# Patient Record
Sex: MALE | Race: BLACK OR AFRICAN AMERICAN | NOT HISPANIC OR LATINO | Employment: FULL TIME | URBAN - METROPOLITAN AREA
[De-identification: names, ages, dates, MRNs, and addresses within clinical notes are randomized per-mention and may not be internally consistent; named-entity substitution may affect disease eponyms.]

---

## 2022-04-04 ENCOUNTER — OFFICE VISIT (OUTPATIENT)
Dept: OTOLARYNGOLOGY | Facility: CLINIC | Age: 67
End: 2022-04-04
Payer: MEDICARE

## 2022-04-04 VITALS — SYSTOLIC BLOOD PRESSURE: 162 MMHG | DIASTOLIC BLOOD PRESSURE: 70 MMHG | TEMPERATURE: 98 F | HEART RATE: 47 BPM

## 2022-04-04 DIAGNOSIS — R49.0 DYSPHONIA: Primary | ICD-10-CM

## 2022-04-04 DIAGNOSIS — J95.09 TRACHEOSTOMY GRANULOMA: ICD-10-CM

## 2022-04-04 DIAGNOSIS — J38.4 SUPRAGLOTTIC EDEMA: ICD-10-CM

## 2022-04-04 DIAGNOSIS — J38.01 UNILATERAL COMPLETE VOCAL FOLD PARALYSIS: ICD-10-CM

## 2022-04-04 DIAGNOSIS — C32.9 LARYNX CANCER: ICD-10-CM

## 2022-04-04 LAB
CTP QC/QA: YES
SARS-COV-2 RDRP RESP QL NAA+PROBE: NEGATIVE

## 2022-04-04 PROCEDURE — 99999 PR PBB SHADOW E&M-NEW PATIENT-LVL III: ICD-10-PCS | Mod: PBBFAC,,, | Performed by: OTOLARYNGOLOGY

## 2022-04-04 PROCEDURE — 3078F DIAST BP <80 MM HG: CPT | Mod: CPTII,S$GLB,, | Performed by: OTOLARYNGOLOGY

## 2022-04-04 PROCEDURE — U0002: ICD-10-PCS | Mod: QW,S$GLB,, | Performed by: OTOLARYNGOLOGY

## 2022-04-04 PROCEDURE — 3288F FALL RISK ASSESSMENT DOCD: CPT | Mod: CPTII,S$GLB,, | Performed by: OTOLARYNGOLOGY

## 2022-04-04 PROCEDURE — 99204 PR OFFICE/OUTPT VISIT, NEW, LEVL IV, 45-59 MIN: ICD-10-PCS | Mod: 25,S$GLB,, | Performed by: OTOLARYNGOLOGY

## 2022-04-04 PROCEDURE — 3288F PR FALLS RISK ASSESSMENT DOCUMENTED: ICD-10-PCS | Mod: CPTII,S$GLB,, | Performed by: OTOLARYNGOLOGY

## 2022-04-04 PROCEDURE — 99204 OFFICE O/P NEW MOD 45 MIN: CPT | Mod: 25,S$GLB,, | Performed by: OTOLARYNGOLOGY

## 2022-04-04 PROCEDURE — 3077F SYST BP >= 140 MM HG: CPT | Mod: CPTII,S$GLB,, | Performed by: OTOLARYNGOLOGY

## 2022-04-04 PROCEDURE — 1159F MED LIST DOCD IN RCRD: CPT | Mod: CPTII,S$GLB,, | Performed by: OTOLARYNGOLOGY

## 2022-04-04 PROCEDURE — 4010F PR ACE/ARB THEARPY RXD/TAKEN: ICD-10-PCS | Mod: CPTII,S$GLB,, | Performed by: OTOLARYNGOLOGY

## 2022-04-04 PROCEDURE — 31575 DIAGNOSTIC LARYNGOSCOPY: CPT | Mod: S$GLB,,, | Performed by: OTOLARYNGOLOGY

## 2022-04-04 PROCEDURE — 3077F PR MOST RECENT SYSTOLIC BLOOD PRESSURE >= 140 MM HG: ICD-10-PCS | Mod: CPTII,S$GLB,, | Performed by: OTOLARYNGOLOGY

## 2022-04-04 PROCEDURE — U0002 COVID-19 LAB TEST NON-CDC: HCPCS | Mod: QW,S$GLB,, | Performed by: OTOLARYNGOLOGY

## 2022-04-04 PROCEDURE — 1101F PR PT FALLS ASSESS DOC 0-1 FALLS W/OUT INJ PAST YR: ICD-10-PCS | Mod: CPTII,S$GLB,, | Performed by: OTOLARYNGOLOGY

## 2022-04-04 PROCEDURE — 1125F PR PAIN SEVERITY QUANTIFIED, PAIN PRESENT: ICD-10-PCS | Mod: CPTII,S$GLB,, | Performed by: OTOLARYNGOLOGY

## 2022-04-04 PROCEDURE — 4010F ACE/ARB THERAPY RXD/TAKEN: CPT | Mod: CPTII,S$GLB,, | Performed by: OTOLARYNGOLOGY

## 2022-04-04 PROCEDURE — 31575 PR LARYNGOSCOPY, FLEXIBLE; DIAGNOSTIC: ICD-10-PCS | Mod: S$GLB,,, | Performed by: OTOLARYNGOLOGY

## 2022-04-04 PROCEDURE — 1159F PR MEDICATION LIST DOCUMENTED IN MEDICAL RECORD: ICD-10-PCS | Mod: CPTII,S$GLB,, | Performed by: OTOLARYNGOLOGY

## 2022-04-04 PROCEDURE — 1101F PT FALLS ASSESS-DOCD LE1/YR: CPT | Mod: CPTII,S$GLB,, | Performed by: OTOLARYNGOLOGY

## 2022-04-04 PROCEDURE — 1125F AMNT PAIN NOTED PAIN PRSNT: CPT | Mod: CPTII,S$GLB,, | Performed by: OTOLARYNGOLOGY

## 2022-04-04 PROCEDURE — 99999 PR PBB SHADOW E&M-NEW PATIENT-LVL III: CPT | Mod: PBBFAC,,, | Performed by: OTOLARYNGOLOGY

## 2022-04-04 PROCEDURE — 3078F PR MOST RECENT DIASTOLIC BLOOD PRESSURE < 80 MM HG: ICD-10-PCS | Mod: CPTII,S$GLB,, | Performed by: OTOLARYNGOLOGY

## 2022-04-04 RX ORDER — CLINDAMYCIN HYDROCHLORIDE 300 MG/1
300 CAPSULE ORAL 3 TIMES DAILY
Qty: 42 CAPSULE | Refills: 0 | Status: SHIPPED | OUTPATIENT
Start: 2022-04-04 | End: 2022-04-18

## 2022-04-04 RX ORDER — MOMETASONE FUROATE 1 MG/G
CREAM TOPICAL DAILY
Qty: 15 G | Refills: 1 | Status: SHIPPED | OUTPATIENT
Start: 2022-04-04 | End: 2023-06-21

## 2022-04-04 NOTE — PROGRESS NOTES
[facility]  Department of Otorhinolaryngology and Communication Sciences    Siddharth Green is a 66 y.o. male who presents to the [facility] self-referred for further evaluation of throat problems.     History is obtained by conversation with the patient and his wife.  There are no available outside records for review.    He has a history of advanced stage laryngeal cancer, for which he underwent chemo radiation completed in August 2021 in the [facility].  He required trach at the time of his direct laryngoscopy with biopsy.  He has been trach dependent since that time.  He required a gastric tube within a few weeks of initiating treatment.  He reports that he lost plenty of weight involuntarily over the course of his treatment.  He had few swallow studies over the course of his treatment which raised concern for aspiration.  His most recent swallow study, however, was inconclusive.  Nevertheless, the patient has been tolerating an unrestricted diet without any aspiration related illnesses.  He reports he has had to post treatment PET CTs which have been negative for residual or recurrent disease.    Today he complains of peristomal tracheostomy pain, which has been present for quite some time.  In addition, he complains that he can no longer breathe through his nose when he has the trach plugged with his finger.  He would like to know why this is the case.  He also would like to know why the trach cannot come out.    Dr. Atkins is his radiation oncologist  Dr. Barba is his medical oncologist    Past Medical History  He has a past medical history of Coronary artery disease, Hyperlipidemia, and Hypertension.    Past Surgical History  He has a past surgical history that includes Coronary angioplasty with stent (8/2012).    Family History  His family history is not on file.    Social History  He reports that he has never smoked. He does not have any smokeless tobacco history on file. He reports  that he does not drink alcohol and does not use drugs.    Allergies  He has No Known Allergies.    Medications  He has a current medication list which includes the following prescription(s): allopurinol, amlodipine, atorvastatin, hydrochlorothiazide, niacin, nitroglycerin, prasugrel, valsartan, and carvedilol.    Review of Systems   Constitutional: Negative for fever.   HENT: Negative for sore throat.    Eyes: Negative for visual disturbance.   Respiratory: Negative for wheezing.    Cardiovascular: Negative for chest pain.   Gastrointestinal: Negative for nausea.   Musculoskeletal: Negative for arthralgias.   Skin: Negative for rash.   Neurological: Negative for tremors.   Hematological: Does not bruise/bleed easily.   Psychiatric/Behavioral: The patient is not nervous/anxious.           Objective:     BP (!) 162/70   Pulse (!) 47   Temp 97.9 °F (36.6 °C)    Physical Exam  Constitutional: comfortable, well dressed  Psychiatric: appropriate affect  Respiratory: comfortably breathing, symmetric chest rise, no stridor  Voice: moderate roughness/asthenia  Cardiovascular: upper extremities non-edematous  Lymphatic: no cervical lymphadenopathy  Neurologic: alert and oriented to time, place, person, and situation; cranial nerves 3-12 grossly intact  Head: normocephalic  Eyes: conjunctivae and sclerae clear  Ears: normal pinnae, normal external auditory canals, tympanic membranes intact  Nose: mucosa pink and noncongested, no masses, no mucopurulence, no polyps  Oral cavity / oropharynx: no mucosal lesions  Neck: diffuse post-treatment changes--edema, firmness; 6-0 metal trach tube in place, patent, secure, skin intact, + peristomal granulation  Indirect laryngoscopy: limited due to gag    Procedure  Flexible Laryngoscopy (83435): Laryngoscopy is indicated for assessment of upper aerodigestive structure and function. This was carried out transnasally with a distal chip videoendoscope. After verbal consent was obtained,  the patient was positioned and the nose was topically decongested with 1% phenylephrine and topically anesthetized with 4% lidocaine. The endoscope was passed through the most patent nasal cavity and positioned to image the nasopharynx, larynx, and hypopharynx in detail. The following features were examined: nasopharyngeal, laryngeal, hypopharyngeal masses; velopharyngeal strength, closure, and symmetry of motion; vocal fold range and symmetry of motion; laryngeal mucosal edema, erythema, inflammation, and hydration; salivary pooling; and gross laryngeal sensation. The equipment was removed. The patient tolerated the procedure well without complication. All findings were normal except:  - right vocal fold immobile, paramedian  - mild left vocal fold motion impairment  - severe posterior larynegla syupraglottic and postcricoid edema    Tracheobronchoscopy through Established Tracheal Stoma (27294): Tracheobronchoscopy is indicated for airway evaluation, as well as to evaluate for sequelae of  a chronic indwelling tracheostomy tube. After verbal consent was obtained, the patient was positioned and the laryngotracheal complex was topically anesthetized with 2 mL of 4% lidocaine administered via the tracheostomy tube. After an adequate degree of anesthesia was obtained, the endoscope was passed through the tracheostomy tubeto achieve a magnified view of the trachea and proximal mainstem bronchi. The scope was retroflexed for a magnified view of the proximal trachea, subglottis, and infraglottic vocal folds. Structures were evaluated for the following features: the presence of laryngeal, tracheal, or bronchial masses; vocal fold range and symmetry of motion; mucosal edema, erythema, or inflammation; mucosal hydration; gross laryngeal sensation; laryngeal stenosis; tracheal stenosis; position of tracheostomy tube; and mucopurulence, blood, or thickened secretions. The equipment was removed. The patient tolerated the  procedure well without complication. All findings were normal except:  - trach in place  - thick dry secretions in distal trachea      Assessment:     Siddharth Green is a 66 y.o. male with chronic aerodigestive dysfunction following treatment for an advanced stage laryngeal cancer, completed in 8/2021. He remains trach-dependent and feeding tube dependent.       Plan:        I had a discussion with the patient and his wife regarding his condition and the further workup and management options.      I showed him a video of his laryngeal examination and educated him on normal anatomy, his disordered anatomy, why the trach is necessary.  I counseled him regarding a significant possibility that he will be tracheostomy dependent for the foreseeable future.    I recommended a 2 week course of clindamycin and topical mometasone for treatment of his peristomal granulation.  I recommended routine trach care.    I am happy to reassess the patient at any time.  In the meantime, I recommended continued follow-up with his other doctors for post treatment cancer surveillance.  He is welcome to establish care with one of our head neck surgery team members for ongoing surveillance from an ENT/head neck perspective.    All questions were answered, and the patient is in agreement with the above.     Ziggy Quiroz M.D.  [facility]  Department of Otorhinolaryngology and Communication Sciences

## 2022-04-13 ENCOUNTER — TELEPHONE (OUTPATIENT)
Dept: OTOLARYNGOLOGY | Facility: CLINIC | Age: 67
End: 2022-04-13
Payer: MEDICARE

## 2022-04-13 NOTE — TELEPHONE ENCOUNTER
----- Message from Isabelle Choudhury RN sent at 4/12/2022 11:01 AM CDT -----  Contact: [facility]    [phone]    ----- Message -----  From: Meagan Benavidez  Sent: 4/12/2022  10:58 AM CDT  To: Richie RODRÍGUEZ Staff    Calling to request a copy of pts clinical notes from 4-4-22.  Pls fax to [phone].

## 2022-06-15 ENCOUNTER — TELEPHONE (OUTPATIENT)
Dept: OTOLARYNGOLOGY | Facility: CLINIC | Age: 67
End: 2022-06-15
Payer: MEDICARE

## 2022-06-15 NOTE — TELEPHONE ENCOUNTER
----- Message from Keren Diop RN sent at 6/10/2022  1:38 PM CDT -----  Regarding: FW: call back  Contact: [phone]  For Monday   ----- Message -----  From: Anjelica Gautam  Sent: 6/10/2022   1:11 PM CDT  To: Zonia Hnedrix Staff  Subject: call back                                        Type:  Sooner Apoointment Request    Caller is requesting a sooner appointment.  Caller declined first available appointment listed below.  Caller will not accept being placed on the waitlist and is requesting a message be sent to doctor.  Name of Caller: New PT   When is the first available appointment? August   Symptoms: Throat scoped due to throat cancer   Would the patient rather a call back or a response via [facility]? Call back   Best Call Back Number: [phone]  Additional Information: would like to be seen sometime in July early morning

## 2022-07-27 ENCOUNTER — OFFICE VISIT (OUTPATIENT)
Dept: OTOLARYNGOLOGY | Facility: CLINIC | Age: 67
End: 2022-07-27
Payer: MEDICARE

## 2022-07-27 VITALS
WEIGHT: 240 LBS | DIASTOLIC BLOOD PRESSURE: 68 MMHG | SYSTOLIC BLOOD PRESSURE: 127 MMHG | BODY MASS INDEX: 33.47 KG/M2 | HEART RATE: 46 BPM

## 2022-07-27 DIAGNOSIS — R49.0 DYSPHONIA: Primary | ICD-10-CM

## 2022-07-27 DIAGNOSIS — J38.01 UNILATERAL COMPLETE VOCAL FOLD PARALYSIS: ICD-10-CM

## 2022-07-27 DIAGNOSIS — J95.09 TRACHEOSTOMY GRANULOMA: ICD-10-CM

## 2022-07-27 DIAGNOSIS — J38.4 SUPRAGLOTTIC EDEMA: ICD-10-CM

## 2022-07-27 DIAGNOSIS — C32.9 LARYNX CANCER: ICD-10-CM

## 2022-07-27 PROCEDURE — 31615 PR SCOPE THRU TRACHEOSTOMY: ICD-10-PCS | Mod: S$GLB,,, | Performed by: OTOLARYNGOLOGY

## 2022-07-27 PROCEDURE — 1159F MED LIST DOCD IN RCRD: CPT | Mod: CPTII,S$GLB,, | Performed by: OTOLARYNGOLOGY

## 2022-07-27 PROCEDURE — 3074F SYST BP LT 130 MM HG: CPT | Mod: CPTII,S$GLB,, | Performed by: OTOLARYNGOLOGY

## 2022-07-27 PROCEDURE — 31575 PR LARYNGOSCOPY, FLEXIBLE; DIAGNOSTIC: ICD-10-PCS | Mod: 59,S$GLB,, | Performed by: OTOLARYNGOLOGY

## 2022-07-27 PROCEDURE — 99999 PR PBB SHADOW E&M-EST. PATIENT-LVL III: CPT | Mod: PBBFAC,,, | Performed by: OTOLARYNGOLOGY

## 2022-07-27 PROCEDURE — 4010F PR ACE/ARB THEARPY RXD/TAKEN: ICD-10-PCS | Mod: CPTII,S$GLB,, | Performed by: OTOLARYNGOLOGY

## 2022-07-27 PROCEDURE — 3008F BODY MASS INDEX DOCD: CPT | Mod: CPTII,S$GLB,, | Performed by: OTOLARYNGOLOGY

## 2022-07-27 PROCEDURE — 1126F AMNT PAIN NOTED NONE PRSNT: CPT | Mod: CPTII,S$GLB,, | Performed by: OTOLARYNGOLOGY

## 2022-07-27 PROCEDURE — 3074F PR MOST RECENT SYSTOLIC BLOOD PRESSURE < 130 MM HG: ICD-10-PCS | Mod: CPTII,S$GLB,, | Performed by: OTOLARYNGOLOGY

## 2022-07-27 PROCEDURE — 99213 OFFICE O/P EST LOW 20 MIN: CPT | Mod: 25,S$GLB,, | Performed by: OTOLARYNGOLOGY

## 2022-07-27 PROCEDURE — 31575 DIAGNOSTIC LARYNGOSCOPY: CPT | Mod: 59,S$GLB,, | Performed by: OTOLARYNGOLOGY

## 2022-07-27 PROCEDURE — 31615 TRCHEOBRNCHSC EST TRACHS INC: CPT | Mod: S$GLB,,, | Performed by: OTOLARYNGOLOGY

## 2022-07-27 PROCEDURE — 1126F PR PAIN SEVERITY QUANTIFIED, NO PAIN PRESENT: ICD-10-PCS | Mod: CPTII,S$GLB,, | Performed by: OTOLARYNGOLOGY

## 2022-07-27 PROCEDURE — 1160F PR REVIEW ALL MEDS BY PRESCRIBER/CLIN PHARMACIST DOCUMENTED: ICD-10-PCS | Mod: CPTII,S$GLB,, | Performed by: OTOLARYNGOLOGY

## 2022-07-27 PROCEDURE — 1160F RVW MEDS BY RX/DR IN RCRD: CPT | Mod: CPTII,S$GLB,, | Performed by: OTOLARYNGOLOGY

## 2022-07-27 PROCEDURE — 99213 PR OFFICE/OUTPT VISIT, EST, LEVL III, 20-29 MIN: ICD-10-PCS | Mod: 25,S$GLB,, | Performed by: OTOLARYNGOLOGY

## 2022-07-27 PROCEDURE — 3078F PR MOST RECENT DIASTOLIC BLOOD PRESSURE < 80 MM HG: ICD-10-PCS | Mod: CPTII,S$GLB,, | Performed by: OTOLARYNGOLOGY

## 2022-07-27 PROCEDURE — 3008F PR BODY MASS INDEX (BMI) DOCUMENTED: ICD-10-PCS | Mod: CPTII,S$GLB,, | Performed by: OTOLARYNGOLOGY

## 2022-07-27 PROCEDURE — 1159F PR MEDICATION LIST DOCUMENTED IN MEDICAL RECORD: ICD-10-PCS | Mod: CPTII,S$GLB,, | Performed by: OTOLARYNGOLOGY

## 2022-07-27 PROCEDURE — 99999 PR PBB SHADOW E&M-EST. PATIENT-LVL III: ICD-10-PCS | Mod: PBBFAC,,, | Performed by: OTOLARYNGOLOGY

## 2022-07-27 PROCEDURE — 4010F ACE/ARB THERAPY RXD/TAKEN: CPT | Mod: CPTII,S$GLB,, | Performed by: OTOLARYNGOLOGY

## 2022-07-27 PROCEDURE — 3078F DIAST BP <80 MM HG: CPT | Mod: CPTII,S$GLB,, | Performed by: OTOLARYNGOLOGY

## 2022-07-27 NOTE — PROGRESS NOTES
[facility]  Department of Otorhinolaryngology and Communication Sciences    Subjective:      Siddharth Green is a 66 y.o. male who presents for follow-up. He has chronic aerodigestive dysfunction following treatment for an advanced stage laryngeal cancer, completed in 8/2021. He remains trach-dependent and feeding tube dependent.    He is doing well since his last visit.  He is still following with his cancer doctors in the [facility].  He reports that the tracheostomy related pain he was having has resolved following topical mometasone treatment.  He reports that he needs to sleep with his head upright.  If he lies on his side, he finds that mucus buildup in his trach and he has difficulty clearing it.  He reports that his trach sometimes gets occluded from dry secretions.  He is able to manage this by cleaning the cannula and with humidifying the region as needed.  His most recent PET-CT was negative.  He denies throat pain, odynophagia, otalgia, hemoptysis, hematemesis, neck mass.    The patient's medications, allergies, past medical, surgical, social and family histories were reviewed and updated as appropriate.    A detailed review of systems was obtained with pertinent positives as per the above HPI, and otherwise negative.      Objective:     /68 (Patient Position: Sitting)   Pulse (!) 46   Wt 108.9 kg (240 lb)   BMI 33.47 kg/m²    Constitutional: comfortable, well dressed  Psychiatric: appropriate affect  Respiratory: comfortably breathing, symmetric chest rise, no stridor  Voice: mild roughness  Head: normocephalic  Eyes: conjunctivae and sclerae clear  Neck: diffuse post-treatment changes--edema, firmness; 6-0 metal trach tube in place, patent, secure, skin intact  Indirect laryngoscopy is limited due to gag    Procedure  Flexible Laryngoscopy (69358): Laryngoscopy is indicated for assessment of upper aerodigestive structure and function. This was carried out transnasally with a  distal chip videoendoscope. After verbal consent was obtained, the patient was positioned and the nose was topically decongested with 1% phenylephrine and topically anesthetized with 4% lidocaine. The endoscope was passed through the most patent nasal cavity and positioned to image the nasopharynx, larynx, and hypopharynx in detail. The following features were examined: nasopharyngeal, laryngeal, hypopharyngeal masses; velopharyngeal strength, closure, and symmetry of motion; vocal fold range and symmetry of motion; laryngeal mucosal edema, erythema, inflammation, and hydration; salivary pooling; and gross laryngeal sensation. The equipment was removed. The patient tolerated the procedure well without complication. All findings were normal except:  - right vocal fold immobile, paramedian  - mild left vocal fold motion impairment  - posterior glottic scar band  - mild-moderate posterior laryngeal syupraglottic and postcricoid edema    Tracheobronchoscopy through Established Tracheal Stoma (52855): Tracheobronchoscopy is indicated for airway evaluation, as well as to evaluate for sequelae of  a chronic indwelling tracheostomy tube. After verbal consent was obtained, the patient was positioned and the laryngotracheal complex was topically anesthetized with 2 mL of 4% lidocaine administered via the tracheostomy tube. After an adequate degree of anesthesia was obtained, the endoscope was passed through the tracheostomy tubeto achieve a magnified view of the trachea and proximal mainstem bronchi. The scope was retroflexed for a magnified view of the proximal trachea, subglottis, and infraglottic vocal folds. Structures were evaluated for the following features: the presence of laryngeal, tracheal, or bronchial masses; vocal fold range and symmetry of motion; mucosal edema, erythema, or inflammation; mucosal hydration; gross laryngeal sensation; laryngeal stenosis; tracheal stenosis; position of tracheostomy tube; and  mucopurulence, blood, or thickened secretions. The equipment was removed. The patient tolerated the procedure well without complication. All findings were normal except:  - trach in place  - thick dry secretions in mid trachea    Assessment:     Siddharth Green is a 66 y.o. male with chronic aerodigestive dysfunction following treatment for an advanced stage laryngeal cancer, completed in 8/2021. He remains trach-dependent and feeding tube dependent.     Plan:     My SLP colleague and I provided him with some aerodigestive/trach hygiene recommendations, including but not limited tos increasing humidification and use of saline bullets PRN. I recommended routine trach care and continued follow up with his cancer team. He is welcome to undergo surveillance with our head and neck team here at [facility] if he so desires.    All questions were answered, and the patient is in agreement with the plan.    Ziggy Quiroz M.D.  [facility]  Department of Otorhinolaryngology and Communication Sciences

## 2022-07-29 ENCOUNTER — TELEPHONE (OUTPATIENT)
Dept: OTOLARYNGOLOGY | Facility: CLINIC | Age: 67
End: 2022-07-29
Payer: MEDICARE

## 2022-07-29 NOTE — TELEPHONE ENCOUNTER
----- Message from Niles Chavarria sent at 7/29/2022 10:43 AM CDT -----  Contact: Dr.Padmanabha Valdez from [facility] requesting call back RE: no details left        Confirmed contact below:  Contact Name:  Phone Number: [phone] (cell number)

## 2022-10-26 ENCOUNTER — TELEPHONE (OUTPATIENT)
Dept: OTOLARYNGOLOGY | Facility: CLINIC | Age: 67
End: 2022-10-26
Payer: MEDICARE

## 2022-10-26 NOTE — TELEPHONE ENCOUNTER
----- Message from Ruma Brar NP sent at 10/26/2022 10:47 AM CDT -----  Regarding: RE: Appt for trach exchange  I would rather a physician see him since it is changing the type of trach completely.    Thanks!  Karon    ----- Message -----  From: Isabelle Choudhury RN  Sent: 10/26/2022  10:29 AM CDT  To: Ruma Brar NP, Ziggy Quiroz MD  Subject: Appt for trach exchange                          Patient needs to have his metal trach exchanged for plastic disposable trach so that he can have a MRI.  Karon, is that something you're comfortable doing or better to schedule with one of the MDs?  Thank you!  Keren    ----- Message -----  From: Caroline Ocampo MA  Sent: 10/26/2022  10:10 AM CDT  To: Isabelle Choudhury RN, [facility]RÍGUE[facility] Staff    Pt's wife Skyla is calling to get sooner appt than 11/30/22. Pt's wife stated she has sent over message, but have not received a response. Pt reports pt has metastasis cancer of the liver, and that pt may need MRI done. Please reach out to pt's wife at [phone].

## 2022-10-27 ENCOUNTER — OFFICE VISIT (OUTPATIENT)
Dept: OTOLARYNGOLOGY | Facility: CLINIC | Age: 67
End: 2022-10-27
Payer: MEDICARE

## 2022-10-27 VITALS — SYSTOLIC BLOOD PRESSURE: 159 MMHG | DIASTOLIC BLOOD PRESSURE: 76 MMHG | HEART RATE: 48 BPM

## 2022-10-27 DIAGNOSIS — C32.9 LARYNX CANCER: Primary | ICD-10-CM

## 2022-10-27 PROCEDURE — 99213 OFFICE O/P EST LOW 20 MIN: CPT | Mod: S$GLB,,, | Performed by: OTOLARYNGOLOGY

## 2022-10-27 PROCEDURE — 99999 PR PBB SHADOW E&M-EST. PATIENT-LVL III: ICD-10-PCS | Mod: PBBFAC,,, | Performed by: OTOLARYNGOLOGY

## 2022-10-27 PROCEDURE — 3078F DIAST BP <80 MM HG: CPT | Mod: CPTII,S$GLB,, | Performed by: OTOLARYNGOLOGY

## 2022-10-27 PROCEDURE — 4010F ACE/ARB THERAPY RXD/TAKEN: CPT | Mod: CPTII,S$GLB,, | Performed by: OTOLARYNGOLOGY

## 2022-10-27 PROCEDURE — 3077F PR MOST RECENT SYSTOLIC BLOOD PRESSURE >= 140 MM HG: ICD-10-PCS | Mod: CPTII,S$GLB,, | Performed by: OTOLARYNGOLOGY

## 2022-10-27 PROCEDURE — 1159F MED LIST DOCD IN RCRD: CPT | Mod: CPTII,S$GLB,, | Performed by: OTOLARYNGOLOGY

## 2022-10-27 PROCEDURE — 1101F PT FALLS ASSESS-DOCD LE1/YR: CPT | Mod: CPTII,S$GLB,, | Performed by: OTOLARYNGOLOGY

## 2022-10-27 PROCEDURE — 99999 PR PBB SHADOW E&M-EST. PATIENT-LVL III: CPT | Mod: PBBFAC,,, | Performed by: OTOLARYNGOLOGY

## 2022-10-27 PROCEDURE — 3078F PR MOST RECENT DIASTOLIC BLOOD PRESSURE < 80 MM HG: ICD-10-PCS | Mod: CPTII,S$GLB,, | Performed by: OTOLARYNGOLOGY

## 2022-10-27 PROCEDURE — 99213 PR OFFICE/OUTPT VISIT, EST, LEVL III, 20-29 MIN: ICD-10-PCS | Mod: S$GLB,,, | Performed by: OTOLARYNGOLOGY

## 2022-10-27 PROCEDURE — 1126F AMNT PAIN NOTED NONE PRSNT: CPT | Mod: CPTII,S$GLB,, | Performed by: OTOLARYNGOLOGY

## 2022-10-27 PROCEDURE — 3288F FALL RISK ASSESSMENT DOCD: CPT | Mod: CPTII,S$GLB,, | Performed by: OTOLARYNGOLOGY

## 2022-10-27 PROCEDURE — 3077F SYST BP >= 140 MM HG: CPT | Mod: CPTII,S$GLB,, | Performed by: OTOLARYNGOLOGY

## 2022-10-27 PROCEDURE — 1101F PR PT FALLS ASSESS DOC 0-1 FALLS W/OUT INJ PAST YR: ICD-10-PCS | Mod: CPTII,S$GLB,, | Performed by: OTOLARYNGOLOGY

## 2022-10-27 PROCEDURE — 1126F PR PAIN SEVERITY QUANTIFIED, NO PAIN PRESENT: ICD-10-PCS | Mod: CPTII,S$GLB,, | Performed by: OTOLARYNGOLOGY

## 2022-10-27 PROCEDURE — 3288F PR FALLS RISK ASSESSMENT DOCUMENTED: ICD-10-PCS | Mod: CPTII,S$GLB,, | Performed by: OTOLARYNGOLOGY

## 2022-10-27 PROCEDURE — 1159F PR MEDICATION LIST DOCUMENTED IN MEDICAL RECORD: ICD-10-PCS | Mod: CPTII,S$GLB,, | Performed by: OTOLARYNGOLOGY

## 2022-10-27 PROCEDURE — 4010F PR ACE/ARB THEARPY RXD/TAKEN: ICD-10-PCS | Mod: CPTII,S$GLB,, | Performed by: OTOLARYNGOLOGY

## 2022-10-31 NOTE — PROGRESS NOTES
[facility]  Department of Otorhinolaryngology and Communication Sciences    Subjective:      Siddharth Green is a 66 y.o. male who presents for follow-up. He has chronic aerodigestive dysfunction following treatment for an advanced stage laryngeal cancer, completed in 8/2021. He remains trach-dependent and feeding tube dependent.    Since his last visit, there is emerging concern for metastatic cancer in the liver.  An MRI of the abdomen is recommended, but this was not possible due to the presence of his metal Vincent tracheostomy tube.  He presents today to exchange the device.    The patient's medications, allergies, past medical, surgical, social and family histories were reviewed and updated as appropriate.    A detailed review of systems was obtained with pertinent positives as per the above HPI, and otherwise negative.      Objective:     BP (!) 159/76   Pulse (!) 48    Constitutional: comfortable, well dressed  Psychiatric: appropriate affect  Respiratory: comfortably breathing, symmetric chest rise, no stridor  Voice:  mild roughness  Head: normocephalic  Eyes: conjunctivae and sclerae clear  Neck: diffuse post-treatment changes--edema, firmness; 6-0 metal trach tube in place, patent, secure, skin intact  Indirect laryngoscopy is limited due to gag    Tracheostomy tube was uneventfully exchanged to 6 Shiley uncuffed tracheostomy tube.  The patient had difficulty communicating effectively around this tube.  As such, he requested that the tube be exchanged once more back to the 6 0 metal Vincent tracheostomy tube.  This was also carried out uneventfully.    Assessment:     Siddharth Green is a 66 y.o. male with chronic aerodigestive dysfunction following treatment for an advanced stage laryngeal cancer, completed in 8/2021. He remains trach-dependent and feeding tube dependent.     Plan:     The patient has expressed comfort with exchanging his metal tracheostomy to the Shiley tracheostomy on his own prior  to his MRI.  He will replace his metal tracheostomy tube after the MRI is completed.  He expressed comfort with doing so.  He will follow-up with me or one of our head and neck cancer colleagues as needed.    All questions were answered, and the patient is in agreement with the plan.    Ziggy Quiroz M.D.  [facility]  Department of Otorhinolaryngology and Communication Sciences

## 2023-06-21 ENCOUNTER — OFFICE VISIT (OUTPATIENT)
Dept: OTOLARYNGOLOGY | Facility: CLINIC | Age: 68
End: 2023-06-21
Payer: MEDICARE

## 2023-06-21 VITALS
WEIGHT: 242.5 LBS | SYSTOLIC BLOOD PRESSURE: 152 MMHG | DIASTOLIC BLOOD PRESSURE: 68 MMHG | HEIGHT: 71 IN | BODY MASS INDEX: 33.95 KG/M2 | HEART RATE: 50 BPM

## 2023-06-21 DIAGNOSIS — Z85.21 HISTORY OF LARYNGEAL CANCER: Primary | ICD-10-CM

## 2023-06-21 DIAGNOSIS — J95.09 TRACHEOSTOMY GRANULOMA: ICD-10-CM

## 2023-06-21 DIAGNOSIS — R49.0 DYSPHONIA: ICD-10-CM

## 2023-06-21 DIAGNOSIS — Z92.21 HISTORY OF CHEMOTHERAPY: ICD-10-CM

## 2023-06-21 DIAGNOSIS — Z92.3 HISTORY OF RADIATION THERAPY: ICD-10-CM

## 2023-06-21 DIAGNOSIS — J38.4 SUPRAGLOTTIC EDEMA: ICD-10-CM

## 2023-06-21 PROCEDURE — 1160F RVW MEDS BY RX/DR IN RCRD: CPT | Mod: CPTII,S$GLB,, | Performed by: OTOLARYNGOLOGY

## 2023-06-21 PROCEDURE — 99999 PR PBB SHADOW E&M-EST. PATIENT-LVL III: CPT | Mod: PBBFAC,,, | Performed by: OTOLARYNGOLOGY

## 2023-06-21 PROCEDURE — 31575 DIAGNOSTIC LARYNGOSCOPY: CPT | Mod: S$GLB,,, | Performed by: OTOLARYNGOLOGY

## 2023-06-21 PROCEDURE — 1159F PR MEDICATION LIST DOCUMENTED IN MEDICAL RECORD: ICD-10-PCS | Mod: CPTII,S$GLB,, | Performed by: OTOLARYNGOLOGY

## 2023-06-21 PROCEDURE — 31615 TRCHEOBRNCHSC EST TRACHS INC: CPT | Mod: 59,S$GLB,, | Performed by: OTOLARYNGOLOGY

## 2023-06-21 PROCEDURE — 3008F BODY MASS INDEX DOCD: CPT | Mod: CPTII,S$GLB,, | Performed by: OTOLARYNGOLOGY

## 2023-06-21 PROCEDURE — 31615 PR SCOPE THRU TRACHEOSTOMY: ICD-10-PCS | Mod: 59,S$GLB,, | Performed by: OTOLARYNGOLOGY

## 2023-06-21 PROCEDURE — 99214 PR OFFICE/OUTPT VISIT, EST, LEVL IV, 30-39 MIN: ICD-10-PCS | Mod: 25,S$GLB,, | Performed by: OTOLARYNGOLOGY

## 2023-06-21 PROCEDURE — 1126F PR PAIN SEVERITY QUANTIFIED, NO PAIN PRESENT: ICD-10-PCS | Mod: CPTII,S$GLB,, | Performed by: OTOLARYNGOLOGY

## 2023-06-21 PROCEDURE — 1126F AMNT PAIN NOTED NONE PRSNT: CPT | Mod: CPTII,S$GLB,, | Performed by: OTOLARYNGOLOGY

## 2023-06-21 PROCEDURE — 1159F MED LIST DOCD IN RCRD: CPT | Mod: CPTII,S$GLB,, | Performed by: OTOLARYNGOLOGY

## 2023-06-21 PROCEDURE — 4010F PR ACE/ARB THEARPY RXD/TAKEN: ICD-10-PCS | Mod: CPTII,S$GLB,, | Performed by: OTOLARYNGOLOGY

## 2023-06-21 PROCEDURE — 3077F PR MOST RECENT SYSTOLIC BLOOD PRESSURE >= 140 MM HG: ICD-10-PCS | Mod: CPTII,S$GLB,, | Performed by: OTOLARYNGOLOGY

## 2023-06-21 PROCEDURE — 3077F SYST BP >= 140 MM HG: CPT | Mod: CPTII,S$GLB,, | Performed by: OTOLARYNGOLOGY

## 2023-06-21 PROCEDURE — 4010F ACE/ARB THERAPY RXD/TAKEN: CPT | Mod: CPTII,S$GLB,, | Performed by: OTOLARYNGOLOGY

## 2023-06-21 PROCEDURE — 99214 OFFICE O/P EST MOD 30 MIN: CPT | Mod: 25,S$GLB,, | Performed by: OTOLARYNGOLOGY

## 2023-06-21 PROCEDURE — 3078F PR MOST RECENT DIASTOLIC BLOOD PRESSURE < 80 MM HG: ICD-10-PCS | Mod: CPTII,S$GLB,, | Performed by: OTOLARYNGOLOGY

## 2023-06-21 PROCEDURE — 99999 PR PBB SHADOW E&M-EST. PATIENT-LVL III: ICD-10-PCS | Mod: PBBFAC,,, | Performed by: OTOLARYNGOLOGY

## 2023-06-21 PROCEDURE — 3078F DIAST BP <80 MM HG: CPT | Mod: CPTII,S$GLB,, | Performed by: OTOLARYNGOLOGY

## 2023-06-21 PROCEDURE — 31575 PR LARYNGOSCOPY, FLEXIBLE; DIAGNOSTIC: ICD-10-PCS | Mod: S$GLB,,, | Performed by: OTOLARYNGOLOGY

## 2023-06-21 PROCEDURE — 1160F PR REVIEW ALL MEDS BY PRESCRIBER/CLIN PHARMACIST DOCUMENTED: ICD-10-PCS | Mod: CPTII,S$GLB,, | Performed by: OTOLARYNGOLOGY

## 2023-06-21 PROCEDURE — 3008F PR BODY MASS INDEX (BMI) DOCUMENTED: ICD-10-PCS | Mod: CPTII,S$GLB,, | Performed by: OTOLARYNGOLOGY

## 2023-06-21 RX ORDER — ROSUVASTATIN CALCIUM 20 MG/1
20 TABLET, COATED ORAL DAILY
COMMUNITY

## 2023-06-21 RX ORDER — NAPROXEN SODIUM 220 MG/1
81 TABLET, FILM COATED ORAL DAILY
COMMUNITY

## 2023-06-21 RX ORDER — CARVEDILOL 25 MG/1
25 TABLET ORAL 2 TIMES DAILY WITH MEALS
COMMUNITY

## 2023-06-21 RX ORDER — LEVOTHYROXINE SODIUM 25 UG/1
25 TABLET ORAL
COMMUNITY

## 2023-06-21 RX ORDER — LOSARTAN POTASSIUM 100 MG/1
100 TABLET ORAL DAILY
COMMUNITY

## 2023-06-21 RX ORDER — FUROSEMIDE 20 MG/1
20 TABLET ORAL 2 TIMES DAILY
COMMUNITY

## 2023-06-21 RX ORDER — MOMETASONE FUROATE 1 MG/G
CREAM TOPICAL DAILY
Qty: 45 G | Refills: 10 | Status: SHIPPED | OUTPATIENT
Start: 2023-06-21

## 2023-06-21 RX ORDER — HYDRALAZINE HYDROCHLORIDE 50 MG/1
50 TABLET, FILM COATED ORAL 3 TIMES DAILY
COMMUNITY

## 2023-06-21 RX ORDER — CHOLECALCIFEROL (VITAMIN D3) 25 MCG
1000 TABLET ORAL DAILY
COMMUNITY

## 2023-06-21 NOTE — PROGRESS NOTES
History of Present Illness:   Siddharth Green is a 67 y.o. year old male evaluated on 6/22/2023, in the Otolaryngology-Head and Neck Surgery Clinic at [facility]. The patient is self-referred for evaluation of recent voice change.  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  He has been trach dependent since with metal tracheostomy.  He was followed by outside otolaryngologist in [facility] system.  He has been seen by Dr. Quiroz previously in our system with last scope done in April of 2022 about a year ago.  Patient had a recent PET scan at outside hospital showing progression of disease and has been switched from immunotherapy to traditional chemotherapy.  He reports that over the last month to do his voice has been worsening with more straining.  He denies any hemoptysis.  He denies any new neck mass or new otalgia or odynophagia.            Past Medical/Surgical History  Past Medical History:   Diagnosis Date    Coronary artery disease     Hyperlipidemia     Hypertension      His  has a past surgical history that includes Coronary angioplasty with stent (8/2012).     Past Family/Social History  His family history is not on file.  He  reports that he has never smoked. He does not have any smokeless tobacco history on file. He reports that he does not drink alcohol and does not use drugs.     Medications/Allergies/Immunizations  His current medication(s) include:   Current Outpatient Medications   Medication Sig Dispense Refill    allopurinol (ZYLOPRIM) 300 MG tablet Take 1 tablet (300 mg total) by mouth once daily. 30 tablet 11    amlodipine (NORVASC) 10 MG tablet Take 10 mg by mouth once daily.      aspirin 81 MG Chew Take 81 mg by mouth once daily.      carvediloL (COREG) 25 MG tablet Take 25 mg by mouth 2 (two) times daily with meals.      furosemide (LASIX) 20 MG tablet Take 20 mg by mouth 2 (two)  times daily.      hydrALAZINE (APRESOLINE) 50 MG tablet Take 50 mg by mouth 3 (three) times daily.      levothyroxine (SYNTHROID) 25 MCG tablet Take 25 mcg by mouth before breakfast.      losartan (COZAAR) 100 MG tablet Take 100 mg by mouth once daily.      rosuvastatin (CRESTOR) 20 MG tablet Take 20 mg by mouth once daily.      vitamin D (VITAMIN D3) 1000 units Tab Take 1,000 Units by mouth once daily.      atorvastatin (LIPITOR) 80 MG tablet Take 80 mg by mouth once daily.      hydrochlorothiazide (HYDRODIURIL) 25 MG tablet Take 25 mg by mouth once daily.      mometasone 0.1% (ELOCON) 0.1 % cream Apply topically once daily. Apply to neck around trach 45 g 10    niacin 500 MG CpSR Take 250 mg by mouth every evening.      nitroGLYCERIN (NITROSTAT) 0.4 MG SL tablet Place 0.4 mg under the tongue every 5 (five) minutes as needed.      prasugreL (EFFIENT) 10 mg Tab Take 10 mg by mouth once daily.      valsartan (DIOVAN) 320 MG tablet Take 320 mg by mouth once daily.       No current facility-administered medications for this visit.        Allergies: Patient has no known allergies.     Immunizations:   Immunization History   Administered Date(s) Administered    COVID-19, MRNA, LN-S, PF (MODERNA FULL 0.5 ML DOSE) 02/26/2021, 03/26/2021, 12/03/2021         Review of Systems   Constitutional: Negative for fever, weight loss and weight gain.  Skin: Negative for rash, itchiness, dryness  HENT:  As per HPI  Cardiovascular: Negative for chest pain and dyspnea on exertion .   Respiratory: Is not experiencing shortness of breath.   Gastrointestinal: Negative for nausea and vomiting.   Neurological: Negative for headaches.   Lymph/Heme: Negative for lymphadenopathy or easy bruising  Musculoskeletal: Negative for joint or muscle pain  Psychiatric: The patient is not nervous/anxious.        All other systems are negative except for that listed in the HPI.      PHYSICAL EXAM:   Vital Signs:  BP (!) 152/68 (BP Location: Left arm,  "Patient Position: Sitting, BP Method: Large (Automatic))   Pulse (!) 50   Ht 5' 11" (1.803 m)   Wt 110 kg (242 lb 8.1 oz)   BMI 33.82 kg/m²      General:  Well-developed, well-nourished  Communication and Voice:  Phonates well with trach occlusion with high-pitched strained voice Hearing: Hearing adequate for verbal communication bilaterally   Inspection:  Normocephalic and atraumatic without mass or lesion  Palpation:  Facial skeleton intact without bony stepoffs  Parotid Glands:  No mass or tenderness  Facial Strength:  Facial motility symmetric and full bilaterally  Pinna:  External ear intact and fully developed  External canal:  Canal is patent with intact skin  Tympanic Membrane:  Clear and mobile  External nose:  No scar or anatomic deformity  Internal Nose:  Septum intact and midline.  No edema, polyp, or rhinorrhea.  TMJ:  No pain to palpation with full mobility  Oral cavity, Lips, Teeth, and Gums:  Mucosa and teeth intact and viable, No lesions, masses or ulcers  Oropharynx: No erythema or exudate, no masses or ulcerations, non-obstructive tonsils  Nasopharynx:  No mass or lesion with intact mucosa  Hypopharynx:  Not well visualized secondary to gagging  Larynx:  Not well visualized secondary to gagging  Neck, Trachea, Lymphatics:  Midline trachea without mass or lesion, tracheostomy in place with postradiation changes and fibrosis with well-formed tract  Eyes: No nystagmus with equal extraocular motion bilaterally  Neuro/Psych/Balance: Patient oriented and appropriate in interaction;  Appropriate mood and affect;  Gait is intact with no imbalance; Cranial nerves I-XII are intact  Respiratory effort:  Equal inspiration and expiration without stridor  Peripheral Vascular:  Warm extremities with equal pulses     Procedure: Flexible fiberoptic nasopharyngoscopy/laryngoscopy and scope through tracheostomy  Indications: Need for detailed exam, hyperactive gag reflex, inadequate mirror " visualization.  Surgeon: Vianca Gurrola MD  Procedure note/findings: After informed discussion of the risks, benefits, and alternatives after indications as noted above, a fiberoptic nasopharyngoscopy and laryngoscopy was recommended for above indications, and the patient consented to this. Nasal cavities were topically anesthetized and decongested with 4% lidocaine and Afrin solutions after which a flexible scope was easily advanced without difficulty into the left and right nasal cavities as well as into the nasopharynx. Nasal cavities were intact without gross mass or lesion. Nasopharynx, similarly, revealed no mass lesion or granularity. There was no ulceration. There was no gross asymmetry. Fossa of Rosenmueller was intact bilaterally. The eustachian tube orifices were intact bilaterally and patent. Posterior and lateral nasal pharyngeal walls were intact and symmetric without ulceration, mass, or granularity. Scope was now advanced distally. Visible oropharynx including lateral walls and tongue base was clear without lesion. Larynx and hypopharynx were examined.  There are extensive postradiation changes in the larynx and supraglottis with right-sided complete immobility and left-sided very limited vocal cord mobility with posterior glottic scar.  In comparison with prior scope by Dr. Quiroz this is unchanged with only change being erythema of the right vocal fold with no distinct mass as well as increased edema and mucus along the epiglottis.  Hypopharynx not well visualized    Tracheostomy tube was removed with view into the trach and down felice and bilateral right and left mainstem bronchi without any lesions.  Attempt was made at retro flexing to underneath the vocal cords however patient could not tolerate this with coughing fits and a bleeding from granulation tissue in tract.  Tracheostomy was replaced.    The scope was then withdrawn and removed.  He did not tolerate this very well as far as the scope  through the trach tract     ASSESSMENT:   1. History of laryngeal cancer    2. History of radiation therapy    3. History of chemotherapy    4. Dysphonia    5. Tracheostomy granuloma    6. Supraglottic edema            PLAN:   I explained to the patient that given limitation of not having examined him before I do not see any obvious change in his scoping them that would suggest recurrence or new cancer.  He does have edema and erythema.  I refilled his prescription of steroid cream that was helping his neck at the trach tract.  He did not tolerate the scope through the tracheostomy and will not remove tracheostomy in the future.  He has a very tight tract with difficulty replacing.  I will see him as needed with continued follow-up with his oncologist at [facility]      I believe that Mr. Green has a good understanding of the issues involved and I answered all of his questions.     DISCLAIMER: This note was prepared with [employer] voice recognition transcription software. Garbled syntax, mangled pronouns, and other bizarre constructions may be attributed to that software system. While efforts were made to correct any mistakes made by this voice recognition program, some errors and/or omissions may remain in the note that were missed when the note was originally created.